# Patient Record
Sex: MALE
[De-identification: names, ages, dates, MRNs, and addresses within clinical notes are randomized per-mention and may not be internally consistent; named-entity substitution may affect disease eponyms.]

---

## 2018-12-07 ENCOUNTER — HOSPITAL ENCOUNTER (EMERGENCY)
Dept: HOSPITAL 97 - ER | Age: 23
Discharge: HOME | End: 2018-12-07
Payer: COMMERCIAL

## 2018-12-07 DIAGNOSIS — R51: Primary | ICD-10-CM

## 2018-12-07 PROCEDURE — 70450 CT HEAD/BRAIN W/O DYE: CPT

## 2018-12-07 PROCEDURE — 99282 EMERGENCY DEPT VISIT SF MDM: CPT

## 2018-12-07 NOTE — EDPHYS
Physician Documentation                                                                           

 [facility]                                                                

Name: Miguelito Umanzor                                                                               

Age: 23 yrs                                                                                       

Sex: Male                                                                                         

: 1995                                                                                   

MRN: [id number]                                                                                   

Arrival Date: 2018                                                                          

Time: 16:55                                                                                       

Account#: [id number]                                                                            

Bed 13                                                                                            

Private MD:                                                                                       

ED Physician Shaji Mcclain                                                                       

HPI:                                                                                              

                                                                                             

23:35 This 23 yrs old  Male presents to ER via Ambulatory with complaints of Headache.kdr 

23:35 The patient complains of pain to the forehead. The patient describes the headache as    kdr 

      intermittent, waxing and waning. Onset: The symptoms/episode began/occurred suddenly,       

      today, The patient had two episodes today that began abruptly and then slowly resolved.     

      He has not had these same exact symptoms before. At the time of exam, his s/s were          

      minimal and vague. He has had headaches in the past but not like this. There are no         

      associated s/s. Associated signs and symptoms: The patient has no apparent associated       

      signs or symptoms. Severity of symptoms: At its worst the pain was severe, earlier          

      today, in the emergency department the pain has improved, markedly. Headache History:       

      The patient has had previous headaches and this one is different than previous              

      episodes, and this one is more severe than previous episodes. The symptoms are              

      alleviated by nothing. the symptoms are aggravated by nothing. The patient has not          

      experienced similar symptoms in the past. The patient has not recently seen a physician.    

                                                                                                  

Historical:                                                                                       

- Allergies:                                                                                      

17:11 No Known Allergies;                                                                     hb  

- Home Meds:                                                                                      

17:11 None [Active];                                                                          hb  

- PMHx:                                                                                           

17:11 None;                                                                                   hb  

                                                                                                  

- Immunization history:: Adult Immunizations up to date.                                          

- Social history:: Smoking status: Patient/guardian denies using tobacco.                         

- Ebola Screening: : No symptoms or risks identified at this time.                                

                                                                                                  

                                                                                                  

ROS:                                                                                              

23:35 Constitutional: Negative for fever, chills, and weight loss, Eyes: Negative for injury, kdr 

      pain, redness, and discharge, ENT: Negative for injury, pain, and discharge, Neck:          

      Negative for injury, pain, and swelling, Cardiovascular: Negative for chest pain,           

      palpitations, and edema, Respiratory: Negative for shortness of breath, cough,              

      wheezing, and pleuritic chest pain, Abdomen/GI: Negative for abdominal pain, nausea,        

      vomiting, diarrhea, and constipation, Back: Negative for injury and pain, : Negative      

      for injury, bleeding, discharge, and swelling, MS/Extremity: Negative for injury and        

      deformity, Skin: Negative for injury, rash, and discoloration, Psych: Negative for          

      depression, anxiety, suicide ideation, homicidal ideation, and hallucinations,              

      Allergy/Immunology: Negative for hives, rash, and allergies, Endocrine: Negative for        

      neck swelling, polydipsia, polyuria, polyphagia, and marked weight changes,                 

      Hematologic/Lymphatic: Negative for swollen nodes, abnormal bleeding, and unusual           

      bruising.                                                                                   

23:35 Neuro: Positive for headache, Negative for altered mental status, dizziness, gait           

      disturbance, hearing loss, loss of consciousness, numbness, seizure activity, speech        

      changes, syncope, near syncope, tingling, tinnitus, tremor, visual changes, weakness,       

      acute changes.                                                                              

                                                                                                  

Exam:                                                                                             

23:35 Constitutional:  This is a well developed, well nourished patient who is awake, alert,  kdr 

      and in no acute distress. Head/Face:  Normocephalic, atraumatic. Eyes:  Pupils equal        

      round and reactive to light, extra-ocular motions intact.  Lids and lashes normal.          

      Conjunctiva and sclera are non-icteric and not injected.  Cornea within normal limits.      

      Periorbital areas with no swelling, redness, or edema. Neck:  Trachea midline, no           

      thyromegaly or masses palpated, and no cervical lymphadenopathy.  Supple, full range of     

      motion without nuchal rigidity, or vertebral point tenderness.  No Meningismus.             

      Chest/axilla:  Normal chest wall appearance and motion.  Nontender with no deformity.       

      No lesions are appreciated. Cardiovascular:  Regular rate and rhythm with a normal S1       

      and S2.  No gallops, murmurs, or rubs.  Normal PMI, no JVD.  No pulse deficits.             

      Respiratory:  Lungs have equal breath sounds bilaterally, clear to auscultation and         

      percussion.  No rales, rhonchi or wheezes noted.  No increased work of breathing, no        

      retractions or nasal flaring. Abdomen/GI:  Soft, non-tender, with normal bowel sounds.      

      No distension or tympany.  No guarding or rebound.  No evidence of tenderness               

      throughout. Back:  No spinal tenderness.  No costovertebral tenderness.  Full range of      

      motion. Skin:  Warm, dry with normal turgor.  Normal color with no rashes, no lesions,      

      and no evidence of cellulitis. MS/ Extremity:  Pulses equal, no cyanosis.                   

      Neurovascular intact.  Full, normal range of motion. Neuro:  Awake and alert, GCS 15,       

      oriented to person, place, time, and situation.  Cranial nerves II-XII grossly intact.      

      Motor strength 5/5 in all extremities.  Sensory grossly intact.  Cerebellar exam            

      normal.  Normal gait. Psych:  Awake, alert, with orientation to person, place and time.     

       Behavior, mood, and affect are within normal limits.                                       

                                                                                                  

Vital Signs:                                                                                      

17:10  / 88; Pulse 84; Resp 16; Temp 97.7; Pulse Ox 100% on R/A; Pain 2/10;             hb  

                                                                                                  

MDM:                                                                                              

18:36 Patient medically screened.                                                             kdr 

23:35 Data reviewed: vital signs, nurses notes, radiologic studies. Counseling: I had a       kdr 

      detailed discussion with the patient and/or guardian regarding: the historical points,      

      exam findings, and any diagnostic results supporting the discharge/admit diagnosis,         

      radiology results, the need for outpatient follow up. ED course: Recommended f/u with       

      neurology.                                                                                  

                                                                                                  

                                                                                             

17:39 Order name: CT Head Brain wo Cont                                                       kdr 

                                                                                                  

Administered Medications:                                                                         

No medications were administered                                                                  

                                                                                                  

                                                                                                  

Disposition:                                                                                      

18 18:36 Discharged to Home. Impression: Headache.                                          

- Condition is Stable.                                                                            

- Discharge Instructions: General Headache Without Cause, Pain Without a Known Cause,             

  Migraine Headache, Easy-to-Read.                                                                

- Prescriptions for Tramadol 50 mg Oral Tablet - take 1 tablet by ORAL route every 8              

  hours as needed; 12 tablet.                                                                     

- Work release form, Medication Reconciliation Form, Thank You Letter form.                       

- Follow up: Private Physician; When: 2 - 3 days; Reason: If symptoms return, Further             

  diagnostic work-up, Recheck today's complaints, Continuance of care, Re-evaluation by           

  your physician.                                                                                 

- Problem is new.                                                                                 

- Symptoms have improved.                                                                         

                                                                                                  

                                                                                                  

                                                                                                  

Signatures:                                                                                       

Dispatcher [employer]                           EDMS                                                 

Shaji Mcclain MD MD kdr Solis, Maria                                 ms                                                   

Gabby Proctor, WINTER                     RN                                                      

                                                                                                  

Corrections: (The following items were deleted from the chart)                                    

19:06 18:36 2018 18:36 Discharged to Home. Impression: Headache. Condition is Stable.   ms  

      Forms are Medication Reconciliation Form, Thank You Letter, Antibiotic Education,           

      Prescription Opioid Use. Follow up: Private Physician; When: 2 - 3 days; Reason: If         

      symptoms return, Further diagnostic work-up, Recheck today's complaints, Continuance of     

      care, Re-evaluation by your physician. Problem is new. Symptoms have improved. kdr          

                                                                                                  

**************************************************************************************************

## 2018-12-07 NOTE — RAD REPORT
EXAM DESCRIPTION:  CT - Head Brain Wo Cont - 12/7/2018 6:05 pm

 

CLINICAL HISTORY:  Intermittent left-sided headache

 

COMPARISON:  None.

 

TECHNIQUE:  Axial 5 mm thick images of the head were obtained without IV contrast.

 

All CT scans are performed using dose optimization technique as appropriate and may include automated
 exposure control or mA/KV adjustment according to patient size.

 

FINDINGS:  No intracranial hemorrhage, mass, edema or shift of mid-line structures. No acute infarcti
on changes seen. No abnormal extra-axial fluid collections. Ventricles are normal.

 

Mastoid air cells and visualized portions of the paranasal sinuses are clear.

 

No acute bony findings.

 

 

IMPRESSION:  Negative non-contrast CT head examination.

## 2018-12-07 NOTE — ER
Nurse's Notes                                                                                     

 [facility]                                                                

Name: Miguelito Umanzor                                                                               

Age: 23 yrs                                                                                       

Sex: Male                                                                                         

: 1995                                                                                   

MRN: [id number]                                                                                   

Arrival Date: 2018                                                                          

Time: 16:55                                                                                       

Account#: [id number]                                                                            

Bed 13                                                                                            

Private MD:                                                                                       

Diagnosis: Headache                                                                               

                                                                                                  

Presentation:                                                                                     

                                                                                             

17:07 Presenting complaint: Headache since 1230 today. Denies photosensitivity/nausea.        hb  

      Transition of care: patient was not received from another setting of care. Onset of         

      symptoms was 2018. Risk Assessment: Do you want to hurt yourself or            

      someone else? Patient reports no desire to harm self or others. Care prior to arrival:      

      Medication(s) given: Excedrin at 1500.                                                      

17:07 Method Of Arrival: Ambulatory                                                           hb  

17:07 Acuity: ACE 3                                                                           hb  

                                                                                                  

Historical:                                                                                       

- Allergies:                                                                                      

17:11 No Known Allergies;                                                                     hb  

- Home Meds:                                                                                      

17:11 None [Active];                                                                          hb  

- PMHx:                                                                                           

17:11 None;                                                                                   hb  

                                                                                                  

- Immunization history:: Adult Immunizations up to date.                                          

- Social history:: Smoking status: Patient/guardian denies using tobacco.                         

- Ebola Screening: : No symptoms or risks identified at this time.                                

                                                                                                  

                                                                                                  

Vital Signs:                                                                                      

17:10  / 88; Pulse 84; Resp 16; Temp 97.7; Pulse Ox 100% on R/A; Pain 2/10;             hb  

                                                                                                  

ED Course:                                                                                        

16:55 Patient arrived in ED.                                                                  mr  

17:10 Triage completed.                                                                       hb  

17:11 Arm band placed on right wrist.                                                         hb  

17:21 Shaji Mcclain MD is Attending Physician.                                              kdr 

17:32 Margarito Frances, RN is Primary Nurse.                                                       sg  

18:07 CT Head Brain wo Cont In Process Unspecified.                                           EDMS

                                                                                                  

Administered Medications:                                                                         

No medications were administered                                                                  

                                                                                                  

                                                                                                  

Outcome:                                                                                          

18:36 Discharge ordered by MD.                                                                kdr 

19:06 Patient left the ED.                                                                    ms  

                                                                                                  

Signatures:                                                                                       

Dispatcher [employer]                           EDMS                                                 

Margarito Frances RN                         RN                                                      

Shaji Mcclain MD MD kdr Rivera, Waleska                                 mr Liu, Gabby Gonzales ms, RN                     RN   hb                                                   

                                                                                                  

Corrections: (The following items were deleted from the chart)                                    

17:11 17:07 Care prior to arrival: None. hb                                                   hb  

                                                                                                  

**************************************************************************************************